# Patient Record
Sex: FEMALE | Race: WHITE
[De-identification: names, ages, dates, MRNs, and addresses within clinical notes are randomized per-mention and may not be internally consistent; named-entity substitution may affect disease eponyms.]

---

## 2022-05-31 ENCOUNTER — HOSPITAL ENCOUNTER (EMERGENCY)
Dept: HOSPITAL 75 - ER | Age: 27
Discharge: HOME | End: 2022-05-31
Payer: SELF-PAY

## 2022-05-31 VITALS — WEIGHT: 264.55 LBS | HEIGHT: 55 IN | BODY MASS INDEX: 61.22 KG/M2

## 2022-05-31 VITALS — SYSTOLIC BLOOD PRESSURE: 122 MMHG | DIASTOLIC BLOOD PRESSURE: 75 MMHG

## 2022-05-31 DIAGNOSIS — S39.011A: Primary | ICD-10-CM
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
## 2022-05-31 DIAGNOSIS — N30.00: ICD-10-CM

## 2022-05-31 DIAGNOSIS — F17.210: ICD-10-CM

## 2022-05-31 LAB
ALBUMIN SERPL-MCNC: 4.2 GM/DL (ref 3.2–4.5)
ALP SERPL-CCNC: 70 U/L (ref 40–136)
ALT SERPL-CCNC: 26 U/L (ref 0–55)
APTT PPP: YELLOW S
BACTERIA #/AREA URNS HPF: (no result) /HPF
BARBITURATES UR QL: NEGATIVE
BASOPHILS # BLD AUTO: 0.1 10^3/UL (ref 0–0.1)
BASOPHILS NFR BLD AUTO: 1 % (ref 0–10)
BENZODIAZ UR QL SCN: NEGATIVE
BILIRUB SERPL-MCNC: 0.5 MG/DL (ref 0.1–1)
BILIRUB UR QL STRIP: NEGATIVE
BUN/CREAT SERPL: 16
CALCIUM SERPL-MCNC: 9.3 MG/DL (ref 8.5–10.1)
CHLORIDE SERPL-SCNC: 106 MMOL/L (ref 98–107)
CO2 SERPL-SCNC: 20 MMOL/L (ref 21–32)
COCAINE UR QL: NEGATIVE
CREAT SERPL-MCNC: 0.85 MG/DL (ref 0.6–1.3)
EOSINOPHIL # BLD AUTO: 0.4 10^3/UL (ref 0–0.3)
EOSINOPHIL NFR BLD AUTO: 5 % (ref 0–10)
FIBRINOGEN PPP-MCNC: CLEAR MG/DL
GFR SERPLBLD BASED ON 1.73 SQ M-ARVRAT: 96 ML/MIN
GLUCOSE SERPL-MCNC: 100 MG/DL (ref 70–105)
GLUCOSE UR STRIP-MCNC: NEGATIVE MG/DL
HCT VFR BLD CALC: 45 % (ref 35–52)
HGB BLD-MCNC: 14.3 G/DL (ref 11.5–16)
KETONES UR QL STRIP: NEGATIVE
LEUKOCYTE ESTERASE UR QL STRIP: (no result)
LIPASE SERPL-CCNC: 29 U/L (ref 8–78)
LYMPHOCYTES # BLD AUTO: 2.4 10^3/UL (ref 1–4)
LYMPHOCYTES NFR BLD AUTO: 28 % (ref 12–44)
MANUAL DIFFERENTIAL PERFORMED BLD QL: NO
MCH RBC QN AUTO: 25 PG (ref 25–34)
MCHC RBC AUTO-ENTMCNC: 32 G/DL (ref 32–36)
MCV RBC AUTO: 77 FL (ref 80–99)
METHADONE UR QL SCN: NEGATIVE
MONOCYTES # BLD AUTO: 0.5 10^3/UL (ref 0–1)
MONOCYTES NFR BLD AUTO: 6 % (ref 0–12)
NEUTROPHILS # BLD AUTO: 5.2 10^3/UL (ref 1.8–7.8)
NEUTROPHILS NFR BLD AUTO: 61 % (ref 42–75)
NITRITE UR QL STRIP: NEGATIVE
OPIATES UR QL SCN: NEGATIVE
OXYCODONE UR QL: NEGATIVE
PH UR STRIP: 5.5 [PH] (ref 5–9)
PLATELET # BLD: 280 10^3/UL (ref 130–400)
PMV BLD AUTO: 9.6 FL (ref 9–12.2)
POTASSIUM SERPL-SCNC: 4.2 MMOL/L (ref 3.6–5)
PROPOXYPH UR QL: NEGATIVE
PROT SERPL-MCNC: 7.4 GM/DL (ref 6.4–8.2)
PROT UR QL STRIP: NEGATIVE
RBC #/AREA URNS HPF: (no result) /HPF
SODIUM SERPL-SCNC: 140 MMOL/L (ref 135–145)
SP GR UR STRIP: 1.02 (ref 1.02–1.02)
SQUAMOUS #/AREA URNS HPF: (no result) /HPF
TRICYCLICS UR QL SCN: NEGATIVE
WBC # BLD AUTO: 8.5 10^3/UL (ref 4.3–11)
WBC #/AREA URNS HPF: (no result) /HPF

## 2022-05-31 PROCEDURE — 83690 ASSAY OF LIPASE: CPT

## 2022-05-31 PROCEDURE — 36415 COLL VENOUS BLD VENIPUNCTURE: CPT

## 2022-05-31 PROCEDURE — 87088 URINE BACTERIA CULTURE: CPT

## 2022-05-31 PROCEDURE — 85025 COMPLETE CBC W/AUTO DIFF WBC: CPT

## 2022-05-31 PROCEDURE — 80053 COMPREHEN METABOLIC PANEL: CPT

## 2022-05-31 PROCEDURE — 80306 DRUG TEST PRSMV INSTRMNT: CPT

## 2022-05-31 PROCEDURE — 81000 URINALYSIS NONAUTO W/SCOPE: CPT

## 2022-05-31 PROCEDURE — 74176 CT ABD & PELVIS W/O CONTRAST: CPT

## 2022-05-31 NOTE — DIAGNOSTIC IMAGING REPORT
EXAMINATION: CT abdomen and pelvis without contrast.



TECHNIQUE: Multiple contiguous axial images were obtained through

the abdomen and pelvis without the use of intravenous contrast.

All CT scans use one or more of the following dose optimizing

techniques: automated exposure control, MA and/or KvP adjustment

based on patient size and exam type or iterative reconstruction. 



HISTORY: Flank pain



COMPARISON: None available.



FINDINGS: 



Limited views of the lower thorax are unremarkable.



The liver is normal without focal lesion. There is no biliary

ductal dilation. Gallbladder is normal.  Pancreas is normal. 

Spleen is normal. Adrenal glands are normal.



The kidneys are normal. There is no hydronephrosis. Urinary

bladder is normal. There are no renal or ureteral stones.



There are phleboliths in the pelvis.



Bowel is normal in caliber without obstruction or inflammation.

The appendix is normal. No free fluid or air. No abdominal or

pelvic lymphadenopathy. Aorta is normal in caliber without

aneurysm.



There are no suspicious osseus lesions.



IMPRESSION:



1. No acute abnormality in the abdomen or pelvis, no renal or

ureteral stones.



Dictated by: 



  Dictated on workstation # [id number]

## 2022-05-31 NOTE — ED GU-FEMALE
General


Chief Complaint:   - Reproductive


Stated Complaint:  LOWER BACK TO FRONT PAIN INTO HIPS





History of Present Illness


Date Seen by Provider:  May 31, 2022


Time Seen by Provider:  06:21


Initial Comments


26-year-old female with no significant PMH, is here with complaints of bilateral

flank pain radiating down to the front of her groin which has been going on for 

the past 4 days.  Pain is more on movement.  Denies recent injury, falls, 

trauma, dysuria, vaginal discharge, vomiting, diarrhea, fever.  Patient's last 

meal was yesterday at lunch.  Patient does have associated nausea.





Allergies and Home Medications


Allergies


Coded Allergies:  


     No Known Drug Allergies (Unverified , 22)





Patient Home Medication List


Home Medication List Reviewed:  Yes





Review of Systems


Review of Systems


Constitutional:  no symptoms reported


EENTM:  no symptoms reported


Respiratory:  no symptoms reported


Cardiovascular:  no symptoms reported


Gastrointestinal:  nausea


Genitourinary:  flank pain


Musculoskeletal:  no symptoms reported


Skin:  no symptoms reported


Psychiatric/Neurological:  No Symptoms Reported


Endocrine:  No Symptoms Reported


Hematologic/Lymphatic:  No Symptoms Reported





Past Medical-Social-Family Hx


Patient Social History


Tobacco Use?:  Yes


Tobacco type used:  Cigarettes


Use of E-Cig and/or Vaping dev:  No


Substance use?:  Yes


Substance type:  Marijuana


Substance frequency:  Once in a while


Alcohol Use?:  No


Pt feels they are or have been:  No





Immunizations Up To Date


Influenza Vaccine Up-to-Date:  No; Not Current


First/Initial COVID19 Vaccinat:  


COVID19 Vaccine :  PFIZER





Physical Exam


Vital Signs





Vital Signs - First Documented








 22





 06:13


 


Temp 36.8


 


Pulse 72


 


Resp 18


 


B/P (MAP) 142/97 (112)





Capillary Refill :


Height, Weight, BMI


Height: '"


Weight: lbs. oz. kg;  BMI


Method:


General Appearance:  no apparent distress


HEENT:  PERRL/EOMI


Neck:  full range of motion


Cardiovascular:  regular rate, rhythm, no edema


Respiratory:  chest non-tender, lungs clear, normal breath sounds


Gastrointestinal:  normal bowel sounds, soft, tenderness (mild tenderness in 

suprapubic area)


Back:  normal inspection, no vertebral tenderness, CVA tenderness (R), CVA 

tenderness (L)


Extremities:  normal range of motion, non-tender, normal inspection


Neurologic/Psychiatric:  no motor/sensory deficits, alert, normal mood/affect, 

oriented x 3


Skin:  normal color





Progress/Results/Core Measures


Suspected Sepsis


SIRS


Temperature: 


Pulse:  


Respiratory Rate: 


 


Laboratory Tests


22 06:30: White Blood Count 8.5


Blood Pressure  / 


Mean: 


 











Laboratory Tests


22 06:30: 


Creatinine 0.85, Platelet Count 280, Total Bilirubin 0.5





Results/Orders


Lab Results





Laboratory Tests








Test


 22


06:30 22


07:23 Range/Units


 


 


White Blood Count


 8.5 


 


 4.3-11.0


10^3/uL


 


Red Blood Count


 5.82 H


 


 3.80-5.11


10^6/uL


 


Hemoglobin 14.3   11.5-16.0  g/dL


 


Hematocrit 45   35-52  %


 


Mean Corpuscular Volume 77 L  80-99  fL


 


Mean Corpuscular Hemoglobin 25   25-34  pg


 


Mean Corpuscular Hemoglobin


Concent 32 


 


 32-36  g/dL





 


Red Cell Distribution Width 14.2   10.0-14.5  %


 


Platelet Count


 280 


 


 130-400


10^3/uL


 


Mean Platelet Volume 9.6   9.0-12.2  fL


 


Immature Granulocyte % (Auto) 0    %


 


Neutrophils (%) (Auto) 61   42-75  %


 


Lymphocytes (%) (Auto) 28   12-44  %


 


Monocytes (%) (Auto) 6   0-12  %


 


Eosinophils (%) (Auto) 5   0-10  %


 


Basophils (%) (Auto) 1   0-10  %


 


Neutrophils # (Auto)


 5.2 


 


 1.8-7.8


10^3/uL


 


Lymphocytes # (Auto)


 2.4 


 


 1.0-4.0


10^3/uL


 


Monocytes # (Auto)


 0.5 


 


 0.0-1.0


10^3/uL


 


Eosinophils # (Auto)


 0.4 H


 


 0.0-0.3


10^3/uL


 


Basophils # (Auto)


 0.1 


 


 0.0-0.1


10^3/uL


 


Immature Granulocyte # (Auto)


 0.0 


 


 0.0-0.1


10^3/uL


 


Sodium Level 140   135-145  MMOL/L


 


Potassium Level 4.2   3.6-5.0  MMOL/L


 


Chloride Level 106     MMOL/L


 


Carbon Dioxide Level 20 L  21-32  MMOL/L


 


Anion Gap 14   5-14  MMOL/L


 


Blood Urea Nitrogen 14   7-18  MG/DL


 


Creatinine


 0.85 


 


 0.60-1.30


MG/DL


 


Estimat Glomerular Filtration


Rate 96 


 


  





 


BUN/Creatinine Ratio 16    


 


Glucose Level 100     MG/DL


 


Calcium Level 9.3   8.5-10.1  MG/DL


 


Corrected Calcium 9.1   8.5-10.1  MG/DL


 


Total Bilirubin 0.5   0.1-1.0  MG/DL


 


Aspartate Amino Transf


(AST/SGOT) 19 


 


 5-34  U/L





 


Alanine Aminotransferase


(ALT/SGPT) 26 


 


 0-55  U/L





 


Alkaline Phosphatase 70     U/L


 


Total Protein 7.4   6.4-8.2  GM/DL


 


Albumin 4.2   3.2-4.5  GM/DL


 


Lipase 29   8-78  U/L


 


Urine Color  YELLOW   


 


Urine Clarity  CLEAR   


 


Urine pH  5.5  5-9  


 


Urine Specific Gravity  1.025 H 1.016-1.022  


 


Urine Protein  NEGATIVE  NEGATIVE  


 


Urine Glucose (UA)  NEGATIVE  NEGATIVE  


 


Urine Ketones  NEGATIVE  NEGATIVE  


 


Urine Nitrite  NEGATIVE  NEGATIVE  


 


Urine Bilirubin  NEGATIVE  NEGATIVE  


 


Urine Urobilinogen  0.2  < = 1.0  MG/DL


 


Urine Leukocyte Esterase  TRACE H NEGATIVE  


 


Urine RBC (Auto)  NEGATIVE  NEGATIVE  


 


Urine RBC  RARE   /HPF


 


Urine WBC  2-5   /HPF


 


Urine Squamous Epithelial


Cells 


 2-5 


  /HPF





 


Urine Crystals  NONE   /LPF


 


Urine Bacteria  FEW H  /HPF


 


Urine Casts  NONE   /LPF


 


Urine Mucus  NEGATIVE   /LPF


 


Urine Culture Indicated  YES   


 


Urine Opiates Screen  NEGATIVE  NEGATIVE  


 


Urine Oxycodone Screen  NEGATIVE  NEGATIVE  


 


Urine Methadone Screen  NEGATIVE  NEGATIVE  


 


Urine Propoxyphene Screen  NEGATIVE  NEGATIVE  


 


Urine Barbiturates Screen  NEGATIVE  NEGATIVE  


 


Ur Tricyclic Antidepressants


Screen 


 NEGATIVE 


 NEGATIVE  





 


Urine Phencyclidine Screen  NEGATIVE  NEGATIVE  


 


Urine Amphetamines Screen  NEGATIVE  NEGATIVE  


 


Urine Methamphetamines Screen  NEGATIVE  NEGATIVE  


 


Urine Benzodiazepines Screen  NEGATIVE  NEGATIVE  


 


Urine Cocaine Screen  NEGATIVE  NEGATIVE  


 


Urine Cannabinoids Screen  POSITIVE H NEGATIVE  








My Orders





Orders - VETO BACK MD


Ct Abd/Pelvis Wo(Kidney Stone) (22 06:31)


Ed Iv/Invasive Line Start (22 06:31)


Ua Culture If Indicated (22 06:31)


Ketorolac Injection (Toradol Injection) (22 06:45)


Cbc With Automated Diff (22 06:31)


Comprehensive Metabolic Panel (22 06:31)


Lipase (22 06:32)


Drug Screen Stat (Urine) (22 07:22)


Morphine  Injection (Morphine  Injection (22 07:23)


Urine Culture (22 07:23)





Medications Given in ED





Current Medications








 Medications  Dose


 Ordered  Sig/Grant


 Route  Start Time


 Stop Time Status Last Admin


Dose Admin


 


 Ketorolac


 Tromethamine  15 mg  ONCE  ONCE


 IV  22 06:45


 22 06:46 DC 22 06:44


15 MG








Vital Signs/I&O











 22





 06:13


 


Temp 36.8


 


Pulse 72


 


Resp 18


 


B/P (MAP) 142/97 (112)





Capillary Refill :


Progress Note :  


Progress Note


1. FLANK PAIN: MILD UTI and muscle strain


- CT ABD & PELVIS


- CBC/ CMP


- UA


- NS IVF


- Toradol 15mg iv , then Morphine 2mg iv once


- Nitrofurantoin 100mg BID for 7 days


- Advised adequate water intake, Ibuprofen prn pain and lidocaine patches


- Follow up with PCP


- the patient was seen in the ED, and treated appropriately to presentation at a

specific point in time. Patient is informed that there is a possibility that 

disease and illness can evolve and change in acuity rapidly or slowly after 

patient is discharged from the ER. Precautionary advice given to the patient for

immediate return to ER if symptoms worsen or do not resolve, and to seek 

emergency care sooner rather than later. Pt also advised on the importance of 

PCP follow up and compliance with management and follow up plan with PCP and/or 

specialist, as this is part of the management plan. Pt verbally expressed und

erstanding.





Diagnostic Imaging





   Diagonstic Imaging:  CT


   Plain Films/CT/US/NM/MRI:  abdomen


Comments


                 [facility], [place]





NAME:   JENNIFER COX [facility] REC#:   [id number]


ACCOUNT#:   [id number]


PT STATUS:   REG ER


:   1995


PHYSICIAN:   VETO BACK MD


ADMIT DATE:   22/ER


                                   ***Draft***


Date of Exam:22





CT ABD/PELVIS WO(KIDNEY STONE)








EXAMINATION: CT abdomen and pelvis without contrast.





TECHNIQUE: Multiple contiguous axial images were obtained through


the abdomen and pelvis without the use of intravenous contrast.


All CT scans use one or more of the following dose optimizing


techniques: automated exposure control, MA and/or KvP adjustment


based on patient size and exam type or iterative reconstruction. 





HISTORY: Flank pain





COMPARISON: None available.





FINDINGS: 





Limited views of the lower thorax are unremarkable.





The liver is normal without focal lesion. There is no biliary


ductal dilation. Gallbladder is normal.  Pancreas is normal. 


Spleen is normal. Adrenal glands are normal.





The kidneys are normal. There is no hydronephrosis. Urinary


bladder is normal. There are no renal or ureteral stones.





There are phleboliths in the pelvis.





Bowel is normal in caliber without obstruction or inflammation.


The appendix is normal. No free fluid or air. No abdominal or


pelvic lymphadenopathy. Aorta is normal in caliber without


aneurysm.





There are no suspicious osseus lesions.





IMPRESSION:





1. No acute abnormality in the abdomen or pelvis, no renal or


ureteral stones.





  Dictated on workstation # [id number]








Dict:   22


Trans:   22


[facility] [id number]





Interpreted by:     MRATHA WALLER MD


Electronically signed by:





Departure


Impression





   Primary Impression:  


   Urinary tract infection


   Qualified Codes:  N30.00 - Acute cystitis without hematuria


   Additional Impression:  


   Muscle strain


Disposition:  01 HOME, SELF-CARE


Condition:  Stable





Departure-Patient Inst.


Referrals:  


[facility] (PCP/Family)


Primary Care Physician


Patient Instructions:  Urinary Tract Infection, Adult (DC), Muscle Strain (DC)





Add. Discharge Instructions:  


- Nitrofurantoin 100mg BID for 7 days


- Advised adequate water intake, Ibuprofen prn pain and lidocaine patches


- Follow up with PCP





All discharge instructions reviewed with patient and/or family. Voiced 

understanding.


Scripts


Nitrofurantoin Macrocrystal (Nitrofurantoin) 100 Mg Capsule


100 MG PO BID for 7 Days, #14 CAP


   Prov: VETO BACK MD         22


Work/School Note:  Work Release Form   Date Seen in the Emergency Department:  

May 31, 2022


   Return to Work:  2022


   Restrictions:  No Restrictions











VETO BACK MD              May 31, 2022 06:21

## 2022-12-05 ENCOUNTER — HOSPITAL ENCOUNTER (EMERGENCY)
Dept: HOSPITAL 75 - ER | Age: 27
Discharge: HOME | End: 2022-12-05
Payer: SELF-PAY

## 2022-12-05 VITALS — HEIGHT: 67.72 IN | WEIGHT: 257.94 LBS | BODY MASS INDEX: 39.55 KG/M2

## 2022-12-05 VITALS — DIASTOLIC BLOOD PRESSURE: 106 MMHG | SYSTOLIC BLOOD PRESSURE: 147 MMHG

## 2022-12-05 DIAGNOSIS — Z28.310: ICD-10-CM

## 2022-12-05 DIAGNOSIS — F17.210: ICD-10-CM

## 2022-12-05 DIAGNOSIS — M54.31: Primary | ICD-10-CM

## 2022-12-05 PROCEDURE — 99284 EMERGENCY DEPT VISIT MOD MDM: CPT

## 2022-12-05 NOTE — ED LOWER EXTREMITY
General


Chief Complaint:  Lower Extremity


Stated Complaint:  SCIATIC PAIN


Nursing Triage Note:  


RIGHT SIDED SCIATICA OFF AND ON FOR 6 MONTHS.


Source:  patient


Exam Limitations:  no limitations





History of Present Illness


Date Seen by Provider:  Dec 5, 2022


Time Seen by Provider:  08:01


Initial Comments


27-year-old female with no pertinent past medical history coming in due to right

flank pain radiating down her right leg.  Been going on for roughly 6 months, 

and she states it feels similar to her previous sciatica.  In the past she has 

been on Flexeril, gabapentin, NSAIDs.  All helped very mildly.  She is done a 

short course of steroids in the past as well.  Has never seen an orthopedist 

over this.  Is getting scheduled to follow-up with pain management here in town 

shortly.  Denies any trauma ever associated with this.  Denies any weakness, 

numbness, bowel issues, bladder issues, chest pain, shortness of breath, 

abdominal pain, nausea, vomiting, diarrhea, dysuria, or any other concerns.





Allergies and Home Medications


Allergies


Coded Allergies:  


     No Known Drug Allergies (Unverified , 5/31/22)





Patient Home Medication List


Home Medication List Reviewed:  Yes


Discontinued Medications


Nitrofurantoin Macrocrystal (Nitrofurantoin) 100 Mg Capsule, 100 MG PO BID


   Discontinued Reason: No Longer Taking


   Prescribed by: VETO BACK MD on 5/31/22 0825


   Last Action: Discontinued





Review of Systems


Constitutional:  No fever


EENTM:  no symptoms reported


Respiratory:  no symptoms reported


Cardiovascular:  no symptoms reported


Gastrointestinal:  no symptoms reported


Genitourinary:  no symptoms reported


Musculoskeletal:  see HPI


Skin:  no symptoms reported


Psychiatric/Neurological:  No Symptoms Reported





All Other Systems Reviewed


Negative Unless Noted:  Yes





Past Medical-Social-Family Hx


Patient Social History


Tobacco Use?:  Yes


Tobacco type used:  Cigarettes


Smoking Status:  Current Everyday Smoker


Substance use?:  Yes


Substance type:  Marijuana


Alcohol Use?:  No





Immunizations Up To Date


First/Initial COVID19 Vaccinat:  UNKNOWN


COVID19 Vaccine :  UNKONWN





Past Medical History


Surgeries:  No





Physical Exam


Vital Signs





Vital Signs - First Documented








 12/5/22





 08:06


 


Temp 36.3


 


Pulse 77


 


Resp 16


 


B/P (MAP) 147/106 (120)


 


Pulse Ox 97


 


O2 Delivery Room Air





Capillary Refill : Less Than 3 Seconds


Height, Weight, BMI


Height: '"


Weight: lbs. oz. kg; 39.00 BMI


Method:


General Appearance:  WD/WN, no apparent distress


HEENT:  PERRL/EOMI, normal ENT inspection, pharynx normal


Neck:  non-tender, full range of motion, supple, normal inspection


Cardiovascular:  regular rate, rhythm, no edema, no murmur


Respiratory:  chest non-tender, lungs clear, normal breath sounds, no 

respiratory distress, no accessory muscle use


Gastrointestinal:  normal bowel sounds, non tender, soft; No distended, No gu

arding, No rebound


Back:  normal inspection, no CVA tenderness, no vertebral tenderness, other 

(Paravertebral tenderness on the right, positive straight leg test on the right)


Hips:  bilateral hip non-tender, bilateral hip normal inspection, bilateral hip 

normal range of motion, bilateral hip no evidence of injury


Reflexes:  2+ knee (R), 2+ knee (L)


Neurologic/Tendon:  normal sensation, normal motor functions, normal tendon 

functions


Neurologic/Psychiatric:  no motor/sensory deficits, alert, normal mood/affect, 

other (Antalgic gait, 5 out of 5 strength with knee extension, flexion, 

dorsiflexion and plantarflexion of the)


Skin:  normal color, warm/dry


Lymphatic:  no adenopathy





Progress/Results/Core Measures


Results/Orders


Vital Signs/I&O











 12/5/22





 08:06


 


Temp 36.3


 


Pulse 77


 


Resp 16


 


B/P (MAP) 147/106 (120)


 


Pulse Ox 97


 


O2 Delivery Room Air














Blood Pressure Mean:                    120











Progress


Progress Note :  


Progress Note


27-year-old female with above history coming in due to right flank pain 

radiating down the right leg.  ABCs were intact and vitals were stable on 

presentation.  Physical exam is consistent with sciatica type pain.  In the ab

sence of trauma, we will forego imaging at this time.  I will have her follow-up

with orthopedics as an outpatient and recommend symptomatic management





Departure


Impression





   Primary Impression:  


   Sciatica


   Qualified Codes:  M54.31 - Sciatica, right side


Disposition:  01 HOME, SELF-CARE


Condition:  Stable





Departure-Patient Inst.


Decision time for Depature:  08:31


Referrals:  


[facility] (PCP/Family)


Primary Care Physician








MADDIE FIGUEROA MD


Patient Instructions:  Sciatica ED





Add. Discharge Instructions:  


Take the Flexeril, gabapentin, ibuprofen and use the lidocaine patches 

liberally.  Follow-up with Dr. Figueroa in [facility] who is a bone specialist.


Scripts


Gabapentin (Gabapentin) 100 Mg Capsule


300 MG PO Q8H for Neuropathic pain for 14 Days, #126 CAP


   Prov: TERRELL RAM MD         12/5/22 


Lidocaine (Lidocaine 5% Patch) 5 % Adh..patch


1 EACH TP Q12H PRN for Neuropathic pain MDD 2 for 14 Days, #28 PATCH


   2 patches max for 12 hours, then 12 hours patch-free period.


   Prov: TERRELL RAM MD         12/5/22 


Pantoprazole Sodium (Pantoprazole Sodium) 20 Mg Tablet.dr


20 MG PO DAILY for 14 Days, #14 TAB


   Prov: TERRELL RAM MD         12/5/22 


Ibuprofen (Ibuprofen) 800 Mg Tablet


800 MG PO Q8H PRN for PAIN for 7 Days, #21 TAB 0 Refills


   Prov: TERRELL RAM MD         12/5/22 


Cyclobenzaprine HCl (Cyclobenzaprine HCl) 10 Mg Tablet


10 MG PO Q8H for 7 Days, #21 TAB


   Prov: TERRELL RAM MD         12/5/22


Work/School Note:  Work Release Form   Date Seen in the Emergency Department:  

Dec 5, 2022


   Return to Work:  Dec 7, 2022


   Restrictions:  No Restrictions











TERRELL RAM MD           Dec 5, 2022 08:33